# Patient Record
Sex: MALE | Race: WHITE | Employment: FULL TIME | URBAN - METROPOLITAN AREA
[De-identification: names, ages, dates, MRNs, and addresses within clinical notes are randomized per-mention and may not be internally consistent; named-entity substitution may affect disease eponyms.]

---

## 2018-02-10 ENCOUNTER — ANESTHESIA (OUTPATIENT)
Dept: SURGERY | Facility: HOSPITAL | Age: 53
DRG: 418 | End: 2018-02-10
Payer: COMMERCIAL

## 2018-02-10 ENCOUNTER — APPOINTMENT (OUTPATIENT)
Dept: ULTRASOUND IMAGING | Facility: HOSPITAL | Age: 53
DRG: 418 | End: 2018-02-10
Attending: EMERGENCY MEDICINE
Payer: COMMERCIAL

## 2018-02-10 ENCOUNTER — SURGERY (OUTPATIENT)
Age: 53
End: 2018-02-10

## 2018-02-10 ENCOUNTER — HOSPITAL ENCOUNTER (INPATIENT)
Facility: HOSPITAL | Age: 53
LOS: 1 days | Discharge: HOME | DRG: 418 | End: 2018-02-11
Attending: EMERGENCY MEDICINE | Admitting: INTERNAL MEDICINE
Payer: COMMERCIAL

## 2018-02-10 ENCOUNTER — ANESTHESIA EVENT (OUTPATIENT)
Dept: SURGERY | Facility: HOSPITAL | Age: 53
DRG: 418 | End: 2018-02-10
Payer: COMMERCIAL

## 2018-02-10 DIAGNOSIS — K81.9 CHOLECYSTITIS: ICD-10-CM

## 2018-02-10 DIAGNOSIS — K81.0 ACUTE CHOLECYSTITIS: ICD-10-CM

## 2018-02-10 DIAGNOSIS — K80.00 CHOLELITHIASIS AND ACUTE CHOLECYSTITIS WITHOUT OBSTRUCTION: ICD-10-CM

## 2018-02-10 DIAGNOSIS — R10.11 ABDOMINAL PAIN, RIGHT UPPER QUADRANT: Primary | ICD-10-CM

## 2018-02-10 DIAGNOSIS — K80.20 GALLSTONE (IMPACTED): ICD-10-CM

## 2018-02-10 PROBLEM — R73.9 HYPERGLYCEMIA: Status: ACTIVE | Noted: 2018-02-10

## 2018-02-10 PROBLEM — E87.1 HYPONATREMIA: Status: ACTIVE | Noted: 2018-02-10

## 2018-02-10 LAB
ALBUMIN SERPL-MCNC: 3.9 G/DL (ref 3.5–4.8)
ALP LIVER SERPL-CCNC: 65 U/L (ref 45–117)
ALT SERPL-CCNC: 29 U/L (ref 17–63)
AST SERPL-CCNC: 17 U/L (ref 15–41)
BASOPHILS # BLD AUTO: 0.06 X10(3) UL (ref 0–0.1)
BASOPHILS NFR BLD AUTO: 0.6 %
BILIRUB SERPL-MCNC: 0.4 MG/DL (ref 0.1–2)
BUN BLD-MCNC: 18 MG/DL (ref 8–20)
CALCIUM BLD-MCNC: 9.7 MG/DL (ref 8.3–10.3)
CHLORIDE: 102 MMOL/L (ref 101–111)
CO2: 26 MMOL/L (ref 22–32)
CREAT BLD-MCNC: 1.11 MG/DL (ref 0.7–1.3)
EOSINOPHIL # BLD AUTO: 0.08 X10(3) UL (ref 0–0.3)
EOSINOPHIL NFR BLD AUTO: 0.7 %
ERYTHROCYTE [DISTWIDTH] IN BLOOD BY AUTOMATED COUNT: 12 % (ref 11.5–16)
GLUCOSE BLD-MCNC: 123 MG/DL (ref 70–99)
HCT VFR BLD AUTO: 44 % (ref 37–53)
HGB BLD-MCNC: 14.8 G/DL (ref 13–17)
IMMATURE GRANULOCYTE COUNT: 0.03 X10(3) UL (ref 0–1)
IMMATURE GRANULOCYTE RATIO %: 0.3 %
LYMPHOCYTES # BLD AUTO: 1.05 X10(3) UL (ref 0.9–4)
LYMPHOCYTES NFR BLD AUTO: 9.8 %
M PROTEIN MFR SERPL ELPH: 7.4 G/DL (ref 6.1–8.3)
MCH RBC QN AUTO: 30 PG (ref 27–33.2)
MCHC RBC AUTO-ENTMCNC: 33.6 G/DL (ref 31–37)
MCV RBC AUTO: 89.2 FL (ref 80–99)
MONOCYTES # BLD AUTO: 0.61 X10(3) UL (ref 0.1–1)
MONOCYTES NFR BLD AUTO: 5.7 %
NEUTROPHIL ABS PRELIM: 8.93 X10 (3) UL (ref 1.3–6.7)
NEUTROPHILS # BLD AUTO: 8.93 X10(3) UL (ref 1.3–6.7)
NEUTROPHILS NFR BLD AUTO: 82.9 %
PLATELET # BLD AUTO: 287 10(3)UL (ref 150–450)
POTASSIUM SERPL-SCNC: 4.3 MMOL/L (ref 3.6–5.1)
RBC # BLD AUTO: 4.93 X10(6)UL (ref 4.3–5.7)
RED CELL DISTRIBUTION WIDTH-SD: 38.9 FL (ref 35.1–46.3)
SODIUM SERPL-SCNC: 135 MMOL/L (ref 136–144)
WBC # BLD AUTO: 10.8 X10(3) UL (ref 4–13)

## 2018-02-10 PROCEDURE — 5A09357 ASSISTANCE WITH RESPIRATORY VENTILATION, LESS THAN 24 CONSECUTIVE HOURS, CONTINUOUS POSITIVE AIRWAY PRESSURE: ICD-10-PCS | Performed by: INTERNAL MEDICINE

## 2018-02-10 PROCEDURE — 96361 HYDRATE IV INFUSION ADD-ON: CPT

## 2018-02-10 PROCEDURE — 94660 CPAP INITIATION&MGMT: CPT

## 2018-02-10 PROCEDURE — 96375 TX/PRO/DX INJ NEW DRUG ADDON: CPT

## 2018-02-10 PROCEDURE — 0FT44ZZ RESECTION OF GALLBLADDER, PERCUTANEOUS ENDOSCOPIC APPROACH: ICD-10-PCS | Performed by: SURGERY

## 2018-02-10 PROCEDURE — 85025 COMPLETE CBC W/AUTO DIFF WBC: CPT | Performed by: EMERGENCY MEDICINE

## 2018-02-10 PROCEDURE — 88304 TISSUE EXAM BY PATHOLOGIST: CPT | Performed by: SURGERY

## 2018-02-10 PROCEDURE — 76700 US EXAM ABDOM COMPLETE: CPT | Performed by: EMERGENCY MEDICINE

## 2018-02-10 PROCEDURE — 96374 THER/PROPH/DIAG INJ IV PUSH: CPT

## 2018-02-10 PROCEDURE — 0DNE4ZZ RELEASE LARGE INTESTINE, PERCUTANEOUS ENDOSCOPIC APPROACH: ICD-10-PCS | Performed by: SURGERY

## 2018-02-10 PROCEDURE — 99285 EMERGENCY DEPT VISIT HI MDM: CPT

## 2018-02-10 PROCEDURE — 80053 COMPREHEN METABOLIC PANEL: CPT | Performed by: EMERGENCY MEDICINE

## 2018-02-10 PROCEDURE — 0DNU4ZZ RELEASE OMENTUM, PERCUTANEOUS ENDOSCOPIC APPROACH: ICD-10-PCS | Performed by: SURGERY

## 2018-02-10 RX ORDER — KETOROLAC TROMETHAMINE 15 MG/ML
15 INJECTION, SOLUTION INTRAMUSCULAR; INTRAVENOUS EVERY 6 HOURS PRN
Status: DISCONTINUED | OUTPATIENT
Start: 2018-02-10 | End: 2018-02-11

## 2018-02-10 RX ORDER — DEXAMETHASONE SODIUM PHOSPHATE 4 MG/ML
VIAL (ML) INJECTION
Status: DISCONTINUED | OUTPATIENT
Start: 2018-02-10 | End: 2018-02-10 | Stop reason: HOSPADM

## 2018-02-10 RX ORDER — HYDROCODONE BITARTRATE AND ACETAMINOPHEN 5; 325 MG/1; MG/1
1 TABLET ORAL AS NEEDED
Status: DISCONTINUED | OUTPATIENT
Start: 2018-02-10 | End: 2018-02-10 | Stop reason: HOSPADM

## 2018-02-10 RX ORDER — HYDROMORPHONE HYDROCHLORIDE 1 MG/ML
1.2 INJECTION, SOLUTION INTRAMUSCULAR; INTRAVENOUS; SUBCUTANEOUS EVERY 2 HOUR PRN
Status: DISCONTINUED | OUTPATIENT
Start: 2018-02-10 | End: 2018-02-11

## 2018-02-10 RX ORDER — DEXTROSE, SODIUM CHLORIDE, AND POTASSIUM CHLORIDE 5; .45; .15 G/100ML; G/100ML; G/100ML
INJECTION INTRAVENOUS CONTINUOUS
Status: DISCONTINUED | OUTPATIENT
Start: 2018-02-10 | End: 2018-02-11

## 2018-02-10 RX ORDER — MEPERIDINE HYDROCHLORIDE 25 MG/ML
12.5 INJECTION INTRAMUSCULAR; INTRAVENOUS; SUBCUTANEOUS AS NEEDED
Status: DISCONTINUED | OUTPATIENT
Start: 2018-02-10 | End: 2018-02-10 | Stop reason: HOSPADM

## 2018-02-10 RX ORDER — MORPHINE SULFATE 2 MG/ML
6 INJECTION, SOLUTION INTRAMUSCULAR; INTRAVENOUS EVERY 2 HOUR PRN
Status: DISCONTINUED | OUTPATIENT
Start: 2018-02-10 | End: 2018-02-11

## 2018-02-10 RX ORDER — HEPARIN SODIUM 5000 [USP'U]/ML
5000 INJECTION, SOLUTION INTRAVENOUS; SUBCUTANEOUS EVERY 12 HOURS SCHEDULED
Status: DISCONTINUED | OUTPATIENT
Start: 2018-02-10 | End: 2018-02-11

## 2018-02-10 RX ORDER — HYDROCODONE BITARTRATE AND ACETAMINOPHEN 5; 325 MG/1; MG/1
1 TABLET ORAL EVERY 4 HOURS PRN
Status: DISCONTINUED | OUTPATIENT
Start: 2018-02-10 | End: 2018-02-11

## 2018-02-10 RX ORDER — HYDROMORPHONE HYDROCHLORIDE 1 MG/ML
0.4 INJECTION, SOLUTION INTRAMUSCULAR; INTRAVENOUS; SUBCUTANEOUS EVERY 2 HOUR PRN
Status: DISCONTINUED | OUTPATIENT
Start: 2018-02-10 | End: 2018-02-11

## 2018-02-10 RX ORDER — ONDANSETRON 2 MG/ML
4 INJECTION INTRAMUSCULAR; INTRAVENOUS EVERY 4 HOURS PRN
Status: DISCONTINUED | OUTPATIENT
Start: 2018-02-10 | End: 2018-02-10

## 2018-02-10 RX ORDER — MORPHINE SULFATE 2 MG/ML
4 INJECTION, SOLUTION INTRAMUSCULAR; INTRAVENOUS ONCE
Status: COMPLETED | OUTPATIENT
Start: 2018-02-10 | End: 2018-02-10

## 2018-02-10 RX ORDER — HYDROMORPHONE HYDROCHLORIDE 1 MG/ML
0.8 INJECTION, SOLUTION INTRAMUSCULAR; INTRAVENOUS; SUBCUTANEOUS EVERY 2 HOUR PRN
Status: DISCONTINUED | OUTPATIENT
Start: 2018-02-10 | End: 2018-02-11

## 2018-02-10 RX ORDER — HYDROCODONE BITARTRATE AND ACETAMINOPHEN 5; 325 MG/1; MG/1
2 TABLET ORAL EVERY 4 HOURS PRN
Status: DISCONTINUED | OUTPATIENT
Start: 2018-02-10 | End: 2018-02-11

## 2018-02-10 RX ORDER — ONDANSETRON 2 MG/ML
INJECTION INTRAMUSCULAR; INTRAVENOUS
Status: DISCONTINUED | OUTPATIENT
Start: 2018-02-10 | End: 2018-02-10 | Stop reason: HOSPADM

## 2018-02-10 RX ORDER — SODIUM CHLORIDE 9 MG/ML
INJECTION, SOLUTION INTRAVENOUS CONTINUOUS
Status: ACTIVE | OUTPATIENT
Start: 2018-02-10 | End: 2018-02-10

## 2018-02-10 RX ORDER — MORPHINE SULFATE 2 MG/ML
4 INJECTION, SOLUTION INTRAMUSCULAR; INTRAVENOUS EVERY 2 HOUR PRN
Status: DISCONTINUED | OUTPATIENT
Start: 2018-02-10 | End: 2018-02-11

## 2018-02-10 RX ORDER — KETOROLAC TROMETHAMINE 30 MG/ML
30 INJECTION, SOLUTION INTRAMUSCULAR; INTRAVENOUS EVERY 6 HOURS PRN
Status: DISCONTINUED | OUTPATIENT
Start: 2018-02-10 | End: 2018-02-11

## 2018-02-10 RX ORDER — DEXTROSE, SODIUM CHLORIDE, AND POTASSIUM CHLORIDE 5; .45; .15 G/100ML; G/100ML; G/100ML
INJECTION INTRAVENOUS
Status: COMPLETED
Start: 2018-02-10 | End: 2018-02-10

## 2018-02-10 RX ORDER — MORPHINE SULFATE 2 MG/ML
2 INJECTION, SOLUTION INTRAMUSCULAR; INTRAVENOUS EVERY 2 HOUR PRN
Status: DISCONTINUED | OUTPATIENT
Start: 2018-02-10 | End: 2018-02-11

## 2018-02-10 RX ORDER — ONDANSETRON 2 MG/ML
4 INJECTION INTRAMUSCULAR; INTRAVENOUS ONCE
Status: COMPLETED | OUTPATIENT
Start: 2018-02-10 | End: 2018-02-10

## 2018-02-10 RX ORDER — METOCLOPRAMIDE HYDROCHLORIDE 5 MG/ML
10 INJECTION INTRAMUSCULAR; INTRAVENOUS EVERY 6 HOURS PRN
Status: DISCONTINUED | OUTPATIENT
Start: 2018-02-10 | End: 2018-02-11

## 2018-02-10 RX ORDER — NALOXONE HYDROCHLORIDE 0.4 MG/ML
80 INJECTION, SOLUTION INTRAMUSCULAR; INTRAVENOUS; SUBCUTANEOUS AS NEEDED
Status: DISCONTINUED | OUTPATIENT
Start: 2018-02-10 | End: 2018-02-10 | Stop reason: HOSPADM

## 2018-02-10 RX ORDER — BUPIVACAINE HYDROCHLORIDE AND EPINEPHRINE 5; 5 MG/ML; UG/ML
INJECTION, SOLUTION EPIDURAL; INTRACAUDAL; PERINEURAL AS NEEDED
Status: DISCONTINUED | OUTPATIENT
Start: 2018-02-10 | End: 2018-02-10 | Stop reason: HOSPADM

## 2018-02-10 RX ORDER — SODIUM CHLORIDE, SODIUM LACTATE, POTASSIUM CHLORIDE, CALCIUM CHLORIDE 600; 310; 30; 20 MG/100ML; MG/100ML; MG/100ML; MG/100ML
INJECTION, SOLUTION INTRAVENOUS CONTINUOUS
Status: DISCONTINUED | OUTPATIENT
Start: 2018-02-10 | End: 2018-02-10 | Stop reason: HOSPADM

## 2018-02-10 RX ORDER — ONDANSETRON 2 MG/ML
4 INJECTION INTRAMUSCULAR; INTRAVENOUS EVERY 6 HOURS PRN
Status: DISCONTINUED | OUTPATIENT
Start: 2018-02-10 | End: 2018-02-11

## 2018-02-10 RX ORDER — METOCLOPRAMIDE HYDROCHLORIDE 5 MG/ML
INJECTION INTRAMUSCULAR; INTRAVENOUS
Status: DISCONTINUED | OUTPATIENT
Start: 2018-02-10 | End: 2018-02-10 | Stop reason: HOSPADM

## 2018-02-10 RX ORDER — HYDROCODONE BITARTRATE AND ACETAMINOPHEN 5; 325 MG/1; MG/1
2 TABLET ORAL AS NEEDED
Status: DISCONTINUED | OUTPATIENT
Start: 2018-02-10 | End: 2018-02-10 | Stop reason: HOSPADM

## 2018-02-10 RX ORDER — CEFOXITIN 2 G/1
INJECTION, POWDER, FOR SOLUTION INTRAVENOUS
Status: DISCONTINUED | OUTPATIENT
Start: 2018-02-10 | End: 2018-02-10 | Stop reason: HOSPADM

## 2018-02-10 RX ORDER — ONDANSETRON 2 MG/ML
4 INJECTION INTRAMUSCULAR; INTRAVENOUS AS NEEDED
Status: DISCONTINUED | OUTPATIENT
Start: 2018-02-10 | End: 2018-02-10 | Stop reason: HOSPADM

## 2018-02-10 RX ORDER — MIDAZOLAM HYDROCHLORIDE 1 MG/ML
1 INJECTION INTRAMUSCULAR; INTRAVENOUS EVERY 5 MIN PRN
Status: DISCONTINUED | OUTPATIENT
Start: 2018-02-10 | End: 2018-02-10 | Stop reason: HOSPADM

## 2018-02-10 NOTE — ED PROVIDER NOTES
Patient Seen in: [facility] Emergency Department    History   Patient presents with:  Abdomen/Flank Pain (GI/)    Stated Complaint:     HPI    Pleasant 50-year-old man presents to the emergency department with abdominal pain.   He states that it be 3.5 oz/week     Standard drinks or equivalent: 7 per week      Review of Systems    Positive for stated complaint:   Other systems are as noted in HPI. Constitutional and vital signs reviewed.       All other systems reviewed and negative except a deformity. Neurological: He is alert and oriented to person, place, and time. Skin: Skin is warm and dry. No rash noted. He is not diaphoretic. No erythema. No pallor. Psychiatric: He has a normal mood and affect.  His behavior is normal. Judgment nor Impression:  Abdominal pain, right upper quadrant  (primary encounter diagnosis)  Gallstone (impacted)  Acute cholecystitis    Disposition:  Admit  2/10/2018  6:54 am    Follow-up:  No follow-up provider specified.       Medications Prescribed:  Current Dis

## 2018-02-10 NOTE — H&P
General Medicine H&P     Patient presents with:  Abdomen/Flank Pain (GI/)       PCP: Francisco Hodgson MD    History of Present Illness: Patient is a 47year old male with PMH sig for HT, depression, ODALIS.  HL, who p/t [facility] ED c abd pain and found to have acute GI malignancy   • Ear Problems Mother      hearing loss   • Depression Mother    • Diabetes Daughter 6     IDDM   • Thyroid Disorder Sister        Review of Systems  Comprehensive ROS reviewed and negative except for what's stated above.  \    OBJECTIVE:  B Normal. KIDNEYS:  Normal.  Right kidney measures 11.1 cm. Left kidney measures 10.8 cm. AORTA/IVC:  Normal.      CONCLUSION:  1. Cholelithiasis with gallbladder sludge, gallbladder wall thickening and pericholecystic fluid, suggestive of cholecystitis.  2.

## 2018-02-10 NOTE — BRIEF OP NOTE
Pre-Operative Diagnosis: Cholecystitis [K81.9]     Post-Operative Diagnosis: * No post-op diagnosis entered *     Procedure Performed:   Procedure(s):  Laparoscopic cholecystectomy, lysis of adhesions    Surgeon(s) and Role:     Maddy Duarte MD - Kiran Rodriguez

## 2018-02-10 NOTE — ANESTHESIA POSTPROCEDURE EVALUATION
[address]  Patient Status:  Inpatient   Age/Gender 47year old male MRN [id number]   [facility] SURGERY Attending Milagro Mann MD   Hosp Day # 0 PCP Mai Ge MD       Anesthesia Post-op Note    Procedure(s):  Lap

## 2018-02-10 NOTE — PROGRESS NOTES
NURSING ADMISSION NOTE      Patient admitted via Cart  Oriented to room. Safety precautions initiated. Bed in low position. Call light in reach.  RECEIVED PT FROM PACU , ALERT AND ORIENT X  4 , ON ROOM AIR , ODALIS , USE CPAP , LAP X 4 WITH STERI STRIPS

## 2018-02-10 NOTE — OPERATIVE REPORT
[address]    PATIENT'S NAME: Jermaine Odell   ATTENDING PHYSICIAN: Dione Handley. Zoila Quiñonez M.D. OPERATING PHYSICIAN: Tran Lala M.D.    PATIENT ACCOUNT#:   [de-identified]    LOCATION:  [address] [place] [address]  MEDICAL RECORD #:   [id number]       DATE OF BIRTH: grasped, retracted over the liver edge. Further adhesions were taken down off of the gallbladder using blunt and Sonicision scalpel dissection. Once this was accomplished, the cystic duct was identified, clipped, and divided.   The cystic artery and its b

## 2018-02-10 NOTE — ANESTHESIA PREPROCEDURE EVALUATION
PRE-OP EVALUATION    Patient Name: Mil Chauhan    Pre-op Diagnosis: Cholecystitis [K81.9]    Procedure(s):  Laparoscopic cholecystectomy, possible open    Surgeon(s) and Role:     La Leo MD - Primary    Pre-op vitals reviewed.   Temp: 97.2 °F ( reviewed.     Lab Results  Component Value Date   WBC 10.8 02/10/2018   WBC 6.14 12/29/2017   RBC 4.93 02/10/2018   RBC 5.10 12/29/2017   HGB 14.8 02/10/2018   HGB 15.5 12/29/2017   HCT 44.0 02/10/2018   HCT 46.4 12/29/2017   MCV 89.2 02/10/2018   MCV 91.0

## 2018-02-10 NOTE — CONSULTS
[facility]  Report of Consultation    Jolie Jean-Baptiste Patient Status:  Inpatient    1965 MRN [id number]   Location [facility] Attending Yaima Ny MD   Hosp Day # 0 PCP Clara Bueno MD     Reason for Consultation:    Jane Shields Grandfather      GI malignancy   • Ear Problems Mother      hearing loss   • Depression Mother    • Diabetes Daughter 6     IDDM   • Thyroid Disorder Sister        Social History:     reports that he has never smoked.  He has never used smokeless tobacco. H the last 72 hours. Radiology:    Us Abdomen Complete (cpt=76700)    Result Date: 2/10/2018  PROCEDURE:  US ABDOMEN COMPLETE (CPT=76700)  COMPARISON:  None.   INDICATIONS:  pain over gallbladder  TECHNIQUE:  Real time gray-scale ultrasound was used

## 2018-02-11 VITALS
TEMPERATURE: 99 F | WEIGHT: 189 LBS | OXYGEN SATURATION: 97 % | DIASTOLIC BLOOD PRESSURE: 72 MMHG | BODY MASS INDEX: 27 KG/M2 | SYSTOLIC BLOOD PRESSURE: 122 MMHG | RESPIRATION RATE: 18 BRPM | HEART RATE: 88 BPM

## 2018-02-11 LAB
ERYTHROCYTE [DISTWIDTH] IN BLOOD BY AUTOMATED COUNT: 12.3 % (ref 11.5–16)
HCT VFR BLD AUTO: 39 % (ref 37–53)
HGB BLD-MCNC: 12.7 G/DL (ref 13–17)
MCH RBC QN AUTO: 30.2 PG (ref 27–33.2)
MCHC RBC AUTO-ENTMCNC: 32.6 G/DL (ref 31–37)
MCV RBC AUTO: 92.6 FL (ref 80–99)
PLATELET # BLD AUTO: 271 10(3)UL (ref 150–450)
RBC # BLD AUTO: 4.21 X10(6)UL (ref 4.3–5.7)
RED CELL DISTRIBUTION WIDTH-SD: 41.8 FL (ref 35.1–46.3)
WBC # BLD AUTO: 10.1 X10(3) UL (ref 4–13)

## 2018-02-11 PROCEDURE — 85027 COMPLETE CBC AUTOMATED: CPT | Performed by: SURGERY

## 2018-02-11 RX ORDER — HYDROCODONE BITARTRATE AND ACETAMINOPHEN 5; 325 MG/1; MG/1
1-2 TABLET ORAL EVERY 6 HOURS PRN
Qty: 30 TABLET | Refills: 0 | Status: SHIPPED | OUTPATIENT
Start: 2018-02-11 | End: 2018-02-20

## 2018-02-11 NOTE — PROGRESS NOTES
[facility]  Progress Note    Ryan Strauss Patient Status:  Inpatient    1965 MRN [id number]   [facility]NW-A Attending Mercedes Pool MD   Hosp Day # 1 PCP Apollo Masterson MD     Subjective: Tolerating diet.   Minimal

## 2018-02-11 NOTE — PROGRESS NOTES
[facility] Hospitalist Progress Note     PCP: Eliecer Bonilla MD    Chief Complaint: follow-up    Overnight/Interim Events:      SUBJECTIVE:  Pt feeling well, walking. +flatus, no n/v. Ready for home    OBJECTIVE:  Temp:  [97.6 °F (36.4 °C)-98.6 °F (37 °C)] 98. 6 steatosis  -follow as o/p     Hypothyroidism  -cont home levothyroxine      Depression/anxiety  -SSRI, benzo, wellbutrin     ODALIS  -protocol     Proph  -sq hep    Dispo: home    Questions/concerns were discussed with patient and wife/kids by bedside.  D/w KANIKA

## 2018-02-11 NOTE — RESPIRATORY THERAPY NOTE
ODALIS - Equipment Use Daily Summary:                  . Set Mode: CPAP WITH C-FLEX                . Usage in hours: 8:21                . 90% Pressure (EPAP) level:8.0                . 90% Insp. Pressure (IPAP): Gatzke Master AHI:1.8                .  Suppl

## 2018-02-11 NOTE — PLAN OF CARE
Pt doing well. Seen by Surgeon and Hospitalist. Discharging orders written. Pt tolerating soft diet well, no nausea. Pain mild, controlled with Toradol. Discharging instructions reviewed with pt and wife, all questions answered. Voiced understanding.  GRADY quinn

## 2018-02-15 NOTE — PAYOR COMM NOTE
--------------  DISCHARGE REVIEW    Payor: Ivan ROD/ALEN  Subscriber #:  [id number]  Authorization Number:  [id number]     Admit date: 2/10/18  Admit time:  4648  Discharge Date: 2/11/2018 12:39 PM     Admitting Physician: Murphy Lal MD  Att

## 2018-02-15 NOTE — PAYOR COMM NOTE
--------------  ADMISSION REVIEW     Payor: Campbell ROD/ALEN  Subscriber #:  [id number]  Authorization Number:  [id number]     Admit date: 2/10/18  Admit time: 4937       Admitting Physician: Brayan Brumfield MD  Attending Physician:  No att. provide hypercholesterolemia        Past Surgical History:  1/2014: CARDIAC CALCIUM SCORE      Comment: calcium score 0  10/21/16= Diverticulosis: COLONOSCOPY      Comment: Repeat 2021 7/25/2007: CT HEART W/ CALCIUM SCORING      Comment: calcium score of 0  1985: rebound and guarding. Patient has point tenderness and rebound with some involuntary guarding over his right upper quadrant just below the angle of his liver.   It does seem to be point tender in just this area with no tenderness to palpation in the right abdominal pain with nausea and a few episodes of bilious vomiting. He is point tender over his right upper quadrant and I am concerned that this may represent biliary pathology. Ultrasound ordered along with labs pain medication and nausea medicine.   I h Pt now post-op, was napping. rpeorts sxs started yesterday AM. No f/c. U/s show cholelithiasis with gallbladder sludge, gallbladder wall thickening and pericholecystic fluid, suggestive of cholecystitis, along c hepatic steatosis. Pt taken to OR.      D in the gallbladder neck. Gallbladder wall thickening and mild pericholecystic fluid. Negative sonographic Patel's sign. No bile duct dilatation, the common duct measures 3 mm.  PANCREAS:  The pancreas is suboptimally visualized due to interposed bowel g operating suite at [facility] after informed consent and proper identification, administered a general anesthetic. The patient's abdomen was prepped and draped in the usual sterile fashion. Infraumbilical incision was performed with a #11 scalpel. fascia was closed with 0 Vicryl. All skin incisions were closed with 4-0 Vicryl in a subcuticular fashion. The wounds were infiltrated with local anesthesia, Steri-Stripped, and sterilely dressed.   The patient tolerated the procedure well.      Dictated

## 2021-08-20 PROBLEM — K80.20 GALLSTONE (IMPACTED): Status: RESOLVED | Noted: 2018-02-10 | Resolved: 2021-08-20

## 2021-08-20 PROBLEM — K81.0 ACUTE CHOLECYSTITIS: Status: RESOLVED | Noted: 2018-02-10 | Resolved: 2021-08-20

## 2021-08-20 PROBLEM — R10.11 ABDOMINAL PAIN, RIGHT UPPER QUADRANT: Status: RESOLVED | Noted: 2018-02-10 | Resolved: 2021-08-20

## 2021-08-20 PROBLEM — R73.9 HYPERGLYCEMIA: Status: RESOLVED | Noted: 2018-02-10 | Resolved: 2021-08-20

## 2021-08-20 PROBLEM — E87.1 HYPONATREMIA: Status: RESOLVED | Noted: 2018-02-10 | Resolved: 2021-08-20

## 2021-10-14 ENCOUNTER — APPOINTMENT (OUTPATIENT)
Dept: CT IMAGING | Facility: HOSPITAL | Age: 56
End: 2021-10-14
Attending: EMERGENCY MEDICINE
Payer: COMMERCIAL

## 2021-10-14 ENCOUNTER — HOSPITAL ENCOUNTER (OUTPATIENT)
Facility: HOSPITAL | Age: 56
Setting detail: OBSERVATION
Discharge: HOME | End: 2021-10-15
Attending: EMERGENCY MEDICINE | Admitting: INTERNAL MEDICINE
Payer: COMMERCIAL

## 2021-10-14 DIAGNOSIS — R11.11 VOMITING WITHOUT NAUSEA, INTRACTABILITY OF VOMITING NOT SPECIFIED, UNSPECIFIED VOMITING TYPE: ICD-10-CM

## 2021-10-14 DIAGNOSIS — R42 VERTIGO: Primary | ICD-10-CM

## 2021-10-14 PROCEDURE — 93005 ELECTROCARDIOGRAM TRACING: CPT

## 2021-10-14 PROCEDURE — 70496 CT ANGIOGRAPHY HEAD: CPT | Performed by: EMERGENCY MEDICINE

## 2021-10-14 PROCEDURE — 96376 TX/PRO/DX INJ SAME DRUG ADON: CPT

## 2021-10-14 PROCEDURE — 99285 EMERGENCY DEPT VISIT HI MDM: CPT

## 2021-10-14 PROCEDURE — 96374 THER/PROPH/DIAG INJ IV PUSH: CPT

## 2021-10-14 PROCEDURE — 85025 COMPLETE CBC W/AUTO DIFF WBC: CPT | Performed by: EMERGENCY MEDICINE

## 2021-10-14 PROCEDURE — 80053 COMPREHEN METABOLIC PANEL: CPT | Performed by: EMERGENCY MEDICINE

## 2021-10-14 PROCEDURE — 96375 TX/PRO/DX INJ NEW DRUG ADDON: CPT

## 2021-10-14 PROCEDURE — 70498 CT ANGIOGRAPHY NECK: CPT | Performed by: EMERGENCY MEDICINE

## 2021-10-14 PROCEDURE — 93010 ELECTROCARDIOGRAM REPORT: CPT

## 2021-10-14 RX ORDER — LORAZEPAM 2 MG/ML
1 INJECTION INTRAMUSCULAR ONCE
Status: COMPLETED | OUTPATIENT
Start: 2021-10-14 | End: 2021-10-14

## 2021-10-14 RX ORDER — LEVOTHYROXINE SODIUM 137 UG/1
137 TABLET ORAL
COMMUNITY
End: 2022-01-11

## 2021-10-14 RX ORDER — MECLIZINE HYDROCHLORIDE 25 MG/1
25 TABLET ORAL ONCE
Status: COMPLETED | OUTPATIENT
Start: 2021-10-14 | End: 2021-10-14

## 2021-10-14 RX ORDER — METOCLOPRAMIDE HYDROCHLORIDE 5 MG/ML
10 INJECTION INTRAMUSCULAR; INTRAVENOUS ONCE
Status: COMPLETED | OUTPATIENT
Start: 2021-10-14 | End: 2021-10-14

## 2021-10-14 RX ORDER — VENLAFAXINE HYDROCHLORIDE 150 MG/1
300 TABLET, EXTENDED RELEASE ORAL DAILY
COMMUNITY

## 2021-10-14 RX ORDER — ONDANSETRON 2 MG/ML
4 INJECTION INTRAMUSCULAR; INTRAVENOUS ONCE
Status: COMPLETED | OUTPATIENT
Start: 2021-10-14 | End: 2021-10-14

## 2021-10-14 RX ORDER — BUPROPION HYDROCHLORIDE 300 MG/1
300 TABLET ORAL DAILY
COMMUNITY

## 2021-10-15 VITALS
HEART RATE: 80 BPM | RESPIRATION RATE: 18 BRPM | TEMPERATURE: 98 F | OXYGEN SATURATION: 97 % | DIASTOLIC BLOOD PRESSURE: 95 MMHG | SYSTOLIC BLOOD PRESSURE: 155 MMHG

## 2021-10-15 PROBLEM — R11.11 VOMITING WITHOUT NAUSEA, INTRACTABILITY OF VOMITING NOT SPECIFIED, UNSPECIFIED VOMITING TYPE: Status: ACTIVE | Noted: 2021-10-15

## 2021-10-15 PROCEDURE — 80048 BASIC METABOLIC PNL TOTAL CA: CPT | Performed by: INTERNAL MEDICINE

## 2021-10-15 PROCEDURE — 85025 COMPLETE CBC W/AUTO DIFF WBC: CPT | Performed by: INTERNAL MEDICINE

## 2021-10-15 RX ORDER — VENLAFAXINE HYDROCHLORIDE 75 MG/1
300 CAPSULE, EXTENDED RELEASE ORAL DAILY
Status: DISCONTINUED | OUTPATIENT
Start: 2021-10-15 | End: 2021-10-15

## 2021-10-15 RX ORDER — SODIUM PHOSPHATE, DIBASIC AND SODIUM PHOSPHATE, MONOBASIC 7; 19 G/133ML; G/133ML
1 ENEMA RECTAL ONCE AS NEEDED
Status: DISCONTINUED | OUTPATIENT
Start: 2021-10-15 | End: 2021-10-15

## 2021-10-15 RX ORDER — ENOXAPARIN SODIUM 100 MG/ML
40 INJECTION SUBCUTANEOUS DAILY
Status: DISCONTINUED | OUTPATIENT
Start: 2021-10-15 | End: 2021-10-15

## 2021-10-15 RX ORDER — ACETAMINOPHEN 325 MG/1
650 TABLET ORAL EVERY 6 HOURS PRN
Status: DISCONTINUED | OUTPATIENT
Start: 2021-10-15 | End: 2021-10-15

## 2021-10-15 RX ORDER — MECLIZINE HCL 12.5 MG/1
12.5 TABLET ORAL 3 TIMES DAILY PRN
Status: DISCONTINUED | OUTPATIENT
Start: 2021-10-15 | End: 2021-10-15

## 2021-10-15 RX ORDER — PROCHLORPERAZINE EDISYLATE 5 MG/ML
5 INJECTION INTRAMUSCULAR; INTRAVENOUS EVERY 8 HOURS PRN
Status: DISCONTINUED | OUTPATIENT
Start: 2021-10-15 | End: 2021-10-15

## 2021-10-15 RX ORDER — BISACODYL 10 MG
10 SUPPOSITORY, RECTAL RECTAL
Status: DISCONTINUED | OUTPATIENT
Start: 2021-10-15 | End: 2021-10-15

## 2021-10-15 RX ORDER — ALPRAZOLAM 0.5 MG/1
0.5 TABLET ORAL 3 TIMES DAILY PRN
Status: DISCONTINUED | OUTPATIENT
Start: 2021-10-15 | End: 2021-10-15

## 2021-10-15 RX ORDER — ONDANSETRON 2 MG/ML
4 INJECTION INTRAMUSCULAR; INTRAVENOUS EVERY 6 HOURS PRN
Status: DISCONTINUED | OUTPATIENT
Start: 2021-10-15 | End: 2021-10-15

## 2021-10-15 RX ORDER — MECLIZINE HCL 12.5 MG/1
12.5 TABLET ORAL 3 TIMES DAILY PRN
Qty: 90 TABLET | Refills: 0 | Status: SHIPPED | OUTPATIENT
Start: 2021-10-15

## 2021-10-15 RX ORDER — SODIUM CHLORIDE 9 MG/ML
INJECTION, SOLUTION INTRAVENOUS CONTINUOUS
Status: DISCONTINUED | OUTPATIENT
Start: 2021-10-15 | End: 2021-10-15

## 2021-10-15 RX ORDER — POLYETHYLENE GLYCOL 3350 17 G/17G
17 POWDER, FOR SOLUTION ORAL DAILY PRN
Status: DISCONTINUED | OUTPATIENT
Start: 2021-10-15 | End: 2021-10-15

## 2021-10-15 RX ORDER — DOCUSATE SODIUM 100 MG/1
100 CAPSULE, LIQUID FILLED ORAL 2 TIMES DAILY
Status: DISCONTINUED | OUTPATIENT
Start: 2021-10-15 | End: 2021-10-15

## 2021-10-15 RX ORDER — BUPROPION HYDROCHLORIDE 150 MG/1
300 TABLET ORAL DAILY
Status: DISCONTINUED | OUTPATIENT
Start: 2021-10-15 | End: 2021-10-15

## 2021-10-15 NOTE — ED PROVIDER NOTES
Patient Seen in: [facility] Emergency Department      History   Patient presents with:  Nausea/Vomiting/Diarrhea    Stated Complaint: vertigo/n/v 6-7x vomitting    Subjective:   HPI    Patient is a 23-year-old male comes emergency room for evaluatio lymphadenopathy. LUNG: Lungs clear to auscultation bilaterally, no wheezing, no rales, no rhonchi. CARDIOVASCULAR: Regular rate and rhythm. Normal S1S2. No S3S4 or murmur. ABDOMEN: Bowel sounds are present.  Soft, nontender, nondistended, no pulsatile (XJR=[id number])    Addendum Date: 10/14/2021    ADDENDUM:  Three-dimensional image processing was completed using a separate workstation under concurrent supervision.   Dictated by (CST): Sloan Henson MD on 10/14/2021 at 10:52 PM     Finalized by (CST): Esdras Ramirez cavernous and supraclinoid ICA segments are unremarkable in appearance. The distal branches of the bilateral anterior middle cerebral arteries are unremarkable.   The distal aspect of the right vertebral artery is diminutive with the basilar artery being s LORazepam (ATIVAN) injection 1 mg (1 mg Intravenous Given 10/14/21 1904)   ondansetron (ZOFRAN) injection 4 mg (4 mg Intravenous Given 10/14/21 1904)   iohexol (OMNIPAQUE) 350 MG/ML injection 75 mL (75 mL Intravenous Given 10/14/21 2002)   metoclopramide

## 2021-10-15 NOTE — CM/SW NOTE
MSW, Charge Rn discussed patient's post d/c needs in care rounds. No identified needs at this time, MSW will remain available should needs change.     Plan of Care/Barriers to discharge:  PT for Vestibular to see pt today  Obs Status  IV Fluids  DC not ex

## 2021-10-15 NOTE — H&P
[facility] Hospitalist H&P       CC: Patient presents with:  Nausea/Vomiting/Diarrhea       PCP: Sasha Zamarripa MD    History of Present Illness:    Patient is a 58-year-old male significant past medical history of depression, hypothyroidism, anxiety, sleep apn PAD screen negative   • SLEEP STUDY, ATTENDED  4/2/2010    ODALIS   • VASECTOMY  2004        ALL:  No Known Allergies     Home Medications:  levothyroxine 137 MCG Oral Tab, Take 137 mcg by mouth before breakfast., Disp: , Rfl:   Venlafaxine HCl  MG O abnormal bleeding or bruising     OBJECTIVE:  BP (!) 131/93 (BP Location: Left arm)   Pulse 85   Temp 98.3 °F (36.8 °C) (Oral)   Resp 18   SpO2 97%     BP Readings from Last 3 Encounters:  10/15/21 : (!) 131/93  08/20/21 : 116/82  06/24/20 : 132/88    Wt R 10/14/2021 at 10:52 PM             Result Date: 10/14/2021  PROCEDURE:  CTA BRAIN + CTA CAROTIDS (CPT=70496/86041)  COMPARISON:  None.   INDICATIONS:  vertigo/n/v 6-7x vomitting  TECHNIQUE:  CT angiography of the head and neck were obtained with non-ionic c by the left vertebral artery. The basilar artery is normal in caliber. The distal branches of the bilateral posterior cerebral arteries are unremarkable in appearance. CONCLUSION:  No acute intracranial abnormality is seen.   No evidence of la

## 2021-10-15 NOTE — PLAN OF CARE
Pt. A&O x4, on RA, tele shows NSR. VSS. Up with standby. Still reports dizziness but said it has gotten better, no more photosensitivity. No current nausea. 0.9 infusing at 100/hr. SCDs in place, Lovenox for prophylaxis. Tolerating clear liquid diet.  Fall

## 2021-10-18 ENCOUNTER — TELEPHONE (OUTPATIENT)
Dept: PHYSICAL THERAPY | Facility: HOSPITAL | Age: 56
End: 2021-10-18

## 2021-10-19 ENCOUNTER — OFFICE VISIT (OUTPATIENT)
Dept: PHYSICAL THERAPY | Age: 56
End: 2021-10-19
Attending: HOSPITALIST
Payer: COMMERCIAL

## 2021-10-19 DIAGNOSIS — R42 VERTIGO: ICD-10-CM

## 2021-10-19 PROCEDURE — 97162 PT EVAL MOD COMPLEX 30 MIN: CPT

## 2021-10-19 PROCEDURE — 97110 THERAPEUTIC EXERCISES: CPT

## 2021-10-19 NOTE — PROGRESS NOTES
VESTIBULAR EVALUATION:   Referring Physician: Dr. Jesse Trevizo  Diagnosis: Vertigo ([phone])       Date of Service: 10/19/2021   MD f/u:  N/S    PATIENT Kaur Crump is a 61year old male who presents to therapy today with reports of dizziness, desc evaluation with primary c/o vertigo, resolved 4-5 days ago; with mild dizziness lessening with transfers since. The results of the objective tests and measures show no abnormal testing, good balance, good vestibular function, and no dizziness reproduced. treatment plan, expectations, and prognosis. Pt was also provided recommendations for pursed lip breathing for anxiety and and dizziness; pt ed on vestibular system, differential dx, s/s of possible infection that is clearing, vestibular anatomy.   Patient 10/19/2021  To:1/17/2022

## 2021-10-22 ENCOUNTER — APPOINTMENT (OUTPATIENT)
Dept: PHYSICAL THERAPY | Age: 56
End: 2021-10-22
Attending: HOSPITALIST
Payer: COMMERCIAL

## 2021-11-02 ENCOUNTER — APPOINTMENT (OUTPATIENT)
Dept: PHYSICAL THERAPY | Age: 56
End: 2021-11-02
Attending: HOSPITALIST
Payer: COMMERCIAL

## 2021-11-09 ENCOUNTER — APPOINTMENT (OUTPATIENT)
Dept: PHYSICAL THERAPY | Age: 56
End: 2021-11-09
Attending: HOSPITALIST
Payer: COMMERCIAL

## 2021-11-16 ENCOUNTER — APPOINTMENT (OUTPATIENT)
Dept: PHYSICAL THERAPY | Age: 56
End: 2021-11-16
Attending: HOSPITALIST
Payer: COMMERCIAL

## 2021-11-23 ENCOUNTER — APPOINTMENT (OUTPATIENT)
Dept: PHYSICAL THERAPY | Age: 56
End: 2021-11-23
Attending: HOSPITALIST
Payer: COMMERCIAL

## 2021-11-30 ENCOUNTER — APPOINTMENT (OUTPATIENT)
Dept: PHYSICAL THERAPY | Age: 56
End: 2021-11-30
Attending: HOSPITALIST
Payer: COMMERCIAL

## 2021-12-07 ENCOUNTER — APPOINTMENT (OUTPATIENT)
Dept: PHYSICAL THERAPY | Age: 56
End: 2021-12-07
Attending: HOSPITALIST
Payer: COMMERCIAL

## 2021-12-14 ENCOUNTER — APPOINTMENT (OUTPATIENT)
Dept: PHYSICAL THERAPY | Age: 56
End: 2021-12-14
Attending: HOSPITALIST
Payer: COMMERCIAL

## 2021-12-15 ENCOUNTER — IMMUNIZATION (OUTPATIENT)
Dept: LAB | Facility: HOSPITAL | Age: 56
End: 2021-12-15
Attending: EMERGENCY MEDICINE
Payer: COMMERCIAL

## 2021-12-15 DIAGNOSIS — Z23 NEED FOR VACCINATION: Primary | ICD-10-CM

## 2021-12-15 PROCEDURE — 0004A SARSCOV2 VAC 30MCG/0.3ML IM: CPT

## 2021-12-21 ENCOUNTER — APPOINTMENT (OUTPATIENT)
Dept: PHYSICAL THERAPY | Age: 56
End: 2021-12-21
Attending: HOSPITALIST
Payer: COMMERCIAL

## (undated) DEVICE — SOL  .9 1000ML BTL

## (undated) DEVICE — LIGAMAX 5 MM ENDOSCOPIC MULTIPLE CLIP APPLIER: Brand: LIGAMAX

## (undated) DEVICE — CHLORAPREP 26ML APPLICATOR

## (undated) DEVICE — LAP CHOLE/APPY CDS-LF: Brand: MEDLINE INDUSTRIES, INC.

## (undated) DEVICE — #11 STERILE BLADE: Brand: POLYMER COATED BLADES

## (undated) DEVICE — TROCAR: Brand: KII® SLEEVE

## (undated) DEVICE — KENDALL SCD EXPRESS SLEEVES, KNEE LENGTH, MEDIUM: Brand: KENDALL SCD

## (undated) DEVICE — TROCAR: Brand: KII SHIELDED BLADED ACCESS SYSTEM

## (undated) DEVICE — UNDYED BRAIDED (POLYGLACTIN 910), SYNTHETIC ABSORBABLE SUTURE: Brand: COATED VICRYL

## (undated) DEVICE — SUTURE VICRYL 0 UR-6

## (undated) DEVICE — STERILE POLYISOPRENE POWDER-FREE SURGICAL GLOVES: Brand: PROTEXIS

## (undated) DEVICE — TROCAR: Brand: KII FIOS FIRST ENTRY

## (undated) DEVICE — DISSECTOR SONICISION CORDLESS

## (undated) NOTE — LETTER
[facility]  David Magallon 60 [address] [facility], [address] [place] [address]    Consent for Operation    Date: __________________    Time: _______________    1.  I authorize the performance upon Larry George the following operation:    Procedure(s):  Laparoscopic cholec procedure has been videotaped, the surgeon will obtain the original videotape. The hospital will not be responsible for storage or maintenance of this tape.     6. For the purpose of advancing medical education, I consent to the admittance of observers to t STATEMENTS REQUIRING INSERTION OR COMPLETION WERE FILLED IN.     Signature of Patient:   ___________________________    When the patient is a minor or mentally incompetent to give consent:  Signature of person authorized to consent for patient: ____________ supplements, and pills I can buy without a prescription (including street drugs/illegal medications). Failure to inform my anesthesiologist about these medicines may increase my risk of anesthetic complications.   · If I am allergic to anything or have had Anesthesiologist Signature     Date   Time  I have discussed the procedure and information above with the patient (or patient’s representative) and answered their questions. The patient or their representative has agreed to have anesthesia services.     ___

## (undated) NOTE — LETTER
[facility]  David Magallon 60 [address] [facility], [address] [place] [address]    Consent for Operation    Date: __________________    Time: _______________    1.  I authorize the performance upon [place] the following operation:    * No surgery found * ***    2. I a videotape. [facility] will not be responsible for storage or maintenance of this tape. 6. For the purpose of advancing medical education, I consent to the admittance of observers to the Operating Room.     7. I authorize the use of any specimen, organs Signature of Patient:   ___________________________    When the patient is a minor or mentally incompetent to give consent:  Signature of person authorized to consent for patient: ___________________________   Relationship to patient: _____________________ drugs/illegal medications). Failure to inform my anesthesiologist about these medicines may increase my risk of anesthetic complications. · If I am allergic to anything or have had a reaction to anesthesia before.     3. I understand how the anesthesia med I have discussed the procedure and information above with the patient (or patient’s representative) and answered their questions. The patient or their representative has agreed to have anesthesia services.     _______________________________________________